# Patient Record
Sex: FEMALE | Employment: FULL TIME | URBAN - METROPOLITAN AREA
[De-identification: names, ages, dates, MRNs, and addresses within clinical notes are randomized per-mention and may not be internally consistent; named-entity substitution may affect disease eponyms.]

---
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
## 2022-09-08 ENCOUNTER — HOSPITAL ENCOUNTER (OUTPATIENT)
Dept: NEUROLOGY | Age: 22
Discharge: HOME | End: 2022-09-08
Payer: COMMERCIAL

## 2022-09-08 PROCEDURE — 95816 EEG AWAKE AND DROWSY: CPT | Performed by: PSYCHIATRY & NEUROLOGY

## 2022-09-08 PROCEDURE — 95816 EEG AWAKE AND DROWSY: CPT

## 2022-09-11 NOTE — PROCEDURES
EEG REPORT       Patient: Ulysses Hose Age: 20 y.o. MRN: [id number]      Referring Provider: Chris Way DO    History: This routine 30 minute scalp EEG was recorded with video- monitoring for a 20 y.o. Kayli Lindquist female  who presented with encephalopathy. This EEG was performed to evaluate for focal and epileptiform abnormalities. Ulysses Hose   Current Outpatient Medications   Medication Sig Dispense Refill    rizatriptan (MAXALT-MLT) 5 MG disintegrating tablet DISSOLVE 1 TABLET IN MOUTH ONCE AS NEEDED FOR MIGRAINE. MAY REPEAT IN 2 HOURS IF NEEDED. 18 tablet 2    Norgestim-Eth Estrad Triphasic 0.18/0.215/0.25 MG-35 MCG TABS Take one pill by mouth once daily. 28 tablet 11    phentermine (ADIPEX-P) 37.5 MG tablet Take 1 tablet by mouth every morning (before breakfast) 30 tablet 0    naproxen (NAPROSYN) 500 MG tablet Take 1 tablet by mouth 2 times daily (with meals) 60 tablet 3     No current facility-administered medications for this encounter. Technical Description: This is a 21 channel digital EEG recording with time-locked video. Electrodes were placed in accordance with the 10-20 International System of Electrode Placement. Single lead EKG monitoring as well as temporal electrodes were included. The patient was not sleep deprived. This recording was obtained during wakefulness. EEG Description: The dominant background activity during maximal recorded wakefulness consisted of bioccipitally dominant 9-10 Hz, 25-35 uV symmetric, regular activity that was reactive to eye opening. During drowsiness, the background rhythm waxed and waned and there were periods of slowing. Deeper sleep was not obtained. Photic stimulation - stepwise photic stimulation at 2-30 Hz was performed and there was a biposterior, symmetric, driving response. Hyperventilation - did not produce any abnormality.      No abnormalities were activated by photic stimulation     The EKG channel demonstrated a normal sinus rhythm. Interpretation  This EEG was normal in wakefulness and sleep. Clinical correlation  This EEG was normal. No focal or epileptiform abnormalities were seen.     Robbin Matute MD  Diplomate, American Board of Psychiatry and Neurology  Diplomate, American Board of Clinical Neurophysiology  Diplomate, American Board of Epilepsy

## 2022-12-05 SDOH — HEALTH STABILITY: PHYSICAL HEALTH: ON AVERAGE, HOW MANY MINUTES DO YOU ENGAGE IN EXERCISE AT THIS LEVEL?: 10 MIN

## 2022-12-05 SDOH — HEALTH STABILITY: PHYSICAL HEALTH: ON AVERAGE, HOW MANY DAYS PER WEEK DO YOU ENGAGE IN MODERATE TO STRENUOUS EXERCISE (LIKE A BRISK WALK)?: 1 DAY

## 2022-12-06 ENCOUNTER — OFFICE VISIT (OUTPATIENT)
Dept: FAMILY MEDICINE CLINIC | Age: 22
End: 2022-12-06
Payer: COMMERCIAL

## 2022-12-06 VITALS
TEMPERATURE: 98.9 F | HEIGHT: 66 IN | DIASTOLIC BLOOD PRESSURE: 70 MMHG | OXYGEN SATURATION: 98 % | BODY MASS INDEX: 46.12 KG/M2 | SYSTOLIC BLOOD PRESSURE: 116 MMHG | HEART RATE: 105 BPM | WEIGHT: 287 LBS

## 2022-12-06 DIAGNOSIS — E66.01 MORBID OBESITY WITH BMI OF 45.0-49.9, ADULT (HCC): ICD-10-CM

## 2022-12-06 DIAGNOSIS — E53.8 B12 DEFICIENCY: ICD-10-CM

## 2022-12-06 DIAGNOSIS — N92.6 IRREGULAR PERIODS: ICD-10-CM

## 2022-12-06 DIAGNOSIS — R42 DIZZINESS: Primary | ICD-10-CM

## 2022-12-06 DIAGNOSIS — E03.8 SUBCLINICAL HYPOTHYROIDISM: ICD-10-CM

## 2022-12-06 DIAGNOSIS — Z12.4 SCREENING FOR CERVICAL CANCER: ICD-10-CM

## 2022-12-06 PROBLEM — F32.A DEPRESSION: Status: ACTIVE | Noted: 2018-11-08

## 2022-12-06 PROCEDURE — 99203 OFFICE O/P NEW LOW 30 MIN: CPT | Performed by: NURSE PRACTITIONER

## 2022-12-06 RX ORDER — MECLIZINE HCL 12.5 MG/1
12.5-25 TABLET ORAL 3 TIMES DAILY PRN
COMMUNITY
Start: 2022-08-12

## 2022-12-06 RX ORDER — LEVOTHYROXINE SODIUM 175 UG/1
175 TABLET ORAL DAILY
Qty: 30 TABLET | Refills: 2 | Status: SHIPPED | OUTPATIENT
Start: 2022-12-06

## 2022-12-06 RX ORDER — FLUTICASONE PROPIONATE 50 MCG
1 SPRAY, SUSPENSION (ML) NASAL DAILY
COMMUNITY
Start: 2022-08-05

## 2022-12-06 RX ORDER — TOPIRAMATE 25 MG/1
TABLET ORAL
COMMUNITY
Start: 2022-10-10

## 2022-12-06 RX ORDER — OMEPRAZOLE 20 MG/1
20 CAPSULE, DELAYED RELEASE ORAL DAILY
COMMUNITY
Start: 2022-06-27

## 2022-12-06 RX ORDER — ONDANSETRON 4 MG/1
4 TABLET, FILM COATED ORAL EVERY 8 HOURS PRN
COMMUNITY

## 2022-12-06 SDOH — ECONOMIC STABILITY: FOOD INSECURITY: WITHIN THE PAST 12 MONTHS, THE FOOD YOU BOUGHT JUST DIDN'T LAST AND YOU DIDN'T HAVE MONEY TO GET MORE.: NEVER TRUE

## 2022-12-06 SDOH — ECONOMIC STABILITY: FOOD INSECURITY: WITHIN THE PAST 12 MONTHS, YOU WORRIED THAT YOUR FOOD WOULD RUN OUT BEFORE YOU GOT MONEY TO BUY MORE.: NEVER TRUE

## 2022-12-06 ASSESSMENT — PATIENT HEALTH QUESTIONNAIRE - PHQ9
SUM OF ALL RESPONSES TO PHQ QUESTIONS 1-9: 1
2. FEELING DOWN, DEPRESSED OR HOPELESS: 1
SUM OF ALL RESPONSES TO PHQ QUESTIONS 1-9: 1
SUM OF ALL RESPONSES TO PHQ9 QUESTIONS 1 & 2: 1
1. LITTLE INTEREST OR PLEASURE IN DOING THINGS: 0
SUM OF ALL RESPONSES TO PHQ QUESTIONS 1-9: 1
SUM OF ALL RESPONSES TO PHQ QUESTIONS 1-9: 1

## 2022-12-06 ASSESSMENT — ENCOUNTER SYMPTOMS
VOMITING: 0
ABDOMINAL PAIN: 0
DIARRHEA: 0
NAUSEA: 0
SHORTNESS OF BREATH: 0
CHEST TIGHTNESS: 0
CONSTIPATION: 0
COUGH: 0

## 2022-12-06 ASSESSMENT — SOCIAL DETERMINANTS OF HEALTH (SDOH): HOW HARD IS IT FOR YOU TO PAY FOR THE VERY BASICS LIKE FOOD, HOUSING, MEDICAL CARE, AND HEATING?: NOT HARD AT ALL

## 2022-12-06 NOTE — PROGRESS NOTES
2022     Vanessa Kong (:  2000) is a 20 y.o. female, here for evaluation of the following medical concerns:    HPI  Pt here today to est. [facility]. Working for [employer]. Is . Has new insurance and needs new neuro referral for ongoing dizziness/migraine issues and also GYN for pap and nexplanon mgmt. States she did have irregular periods before implant and struggles to loose weight. Also has fatigue. Has had several scan of head and labs done within last 6 months for dizziness problem. Denies syncope, chest pain, sob, swelling or tinnutis    Review of Systems   Constitutional:  Positive for fatigue. Negative for appetite change, chills, diaphoresis and fever. Eyes:  Negative for visual disturbance. Respiratory:  Negative for cough, chest tightness and shortness of breath. Cardiovascular:  Negative for chest pain, palpitations and leg swelling. Gastrointestinal:  Negative for abdominal pain, constipation, diarrhea, nausea and vomiting. Endocrine: Negative for cold intolerance, heat intolerance, polydipsia, polyphagia and polyuria. Genitourinary:  Negative for menstrual problem. Skin:  Negative for rash. Allergic/Immunologic: Negative for immunocompromised state. Neurological:  Positive for dizziness. Negative for seizures, syncope, speech difficulty, numbness and headaches. Hematological:  Negative for adenopathy. Psychiatric/Behavioral:  Negative for dysphoric mood and sleep disturbance. The patient is not nervous/anxious. Prior to Visit Medications    Medication Sig Taking?  Authorizing Provider   verapamil (CALAN SR) 120 MG extended release tablet Take 120 mg by mouth nightly Yes Historical Provider, MD   topiramate (TOPAMAX) 25 MG tablet TAKE 1 TABLET BY MOUTH EVERY NIGHT FOR 1 WEEK, THEN 1 TAB IN THE MORNING AND NIGHT FOR 1 WEEK, THEN 1 TAB IN THE MORNING AND 2 TABS AT NIGHT Yes Historical Provider, MD   meclizine (ANTIVERT) 12.5 MG tablet Take 12.5-25 mg by mouth 3 times daily as needed Yes Historical Provider, MD   omeprazole (PRILOSEC) 20 MG delayed release capsule Take 20 mg by mouth daily Yes Historical Provider, MD   fluticasone (FLONASE) 50 MCG/ACT nasal spray 1 spray by Nasal route daily Yes Historical Provider, MD   ondansetron (ZOFRAN) 4 MG tablet Take 4 mg by mouth every 8 hours as needed for Nausea or Vomiting Yes Historical Provider, MD   levothyroxine (SYNTHROID) 175 MCG tablet Take 1 tablet by mouth daily Yes Zak Saucedo APRN - CNP        Social History     Tobacco Use    Smoking status: Never    Smokeless tobacco: Never   Substance Use Topics    Alcohol use: Not Currently        Vitals:    12/06/22 1406   BP: 116/70   Site: Left Lower Arm   Position: Sitting   Cuff Size: Medium Adult   Pulse: (!) 105   Temp: 98.9 °F (37.2 °C)   TempSrc: Tympanic   SpO2: 98%   Weight: 287 lb (130.2 kg)   Height: 5' 5.5\" (1.664 m)     Estimated body mass index is 47.03 kg/m² as calculated from the following:    Height as of this encounter: 5' 5.5\" (1.664 m). Weight as of this encounter: 287 lb (130.2 kg). Physical Exam  Vitals and nursing note reviewed. Constitutional:       General: She is not in acute distress. Appearance: Normal appearance. She is well-developed. She is obese. She is not ill-appearing or diaphoretic. HENT:      Head: Normocephalic and atraumatic. Eyes:      Conjunctiva/sclera: Conjunctivae normal.   Cardiovascular:      Rate and Rhythm: Normal rate and regular rhythm. Pulses: Normal pulses. Heart sounds: Normal heart sounds. No murmur heard. Comments: No LE edema  Pulmonary:      Effort: Pulmonary effort is normal.      Breath sounds: Normal breath sounds. Musculoskeletal:      Cervical back: Neck supple. Skin:     General: Skin is warm and dry. Neurological:      General: No focal deficit present. Mental Status: She is alert and oriented to person, place, and time.  Mental status is at baseline. Psychiatric:         Mood and Affect: Mood normal.         Behavior: Behavior normal.         Thought Content: Thought content normal.         Judgment: Judgment normal.       ASSESSMENT/PLAN:  1. Dizziness  Ongoing issues  New referral given for neuro    - Amb External Referral To Neurology  - PTH, Intact; Future    2. Subclinical hypothyroidism  3 abnormal tsh within last year, she was not notified   Will try daily dose for next 6-8 weeks and recheck labs and see if symptom improvement    - levothyroxine (SYNTHROID) 175 MCG tablet; Take 1 tablet by mouth daily  Dispense: 30 tablet; Refill: 2  - TSH; Future  - T4, Free; Future    3. Screening for cervical cancer  F/u with gyn  - Amb External Referral To Ob-Gyn    4. Morbid obesity with BMI of 45.0-49.9, adult [facility])  Patient advised to follow a low carb, low sugar, healthy fat ( avocados, nuts, olive oil etc.) diet and 150 minutes of cardiovascular exercise per week   Ok for intermittent fasting  Check new labs    - Insulin, total; Future    5. Irregular periods  See above    - Insulin, total; Future    6. B12 deficiency  Not currently taking any supplement    - Vitamin B12; Future    CT brain without contrast    Anatomical Region Laterality Modality   Head -- Computed Tomography   Neuro Covera -- --     Narrative    CT BRAIN WO CONT     HISTORY: 76-year-old female with peripheral vertigo. COMPARISON: CT brain without contrast dated 08/02/2022. TECHNIQUE: CT brain without intravenous contrast.  Automated exposure control was utilized. All CT scans at this facility use dose modulation, iterative reconstruction, and/or weight based dosing when appropriate to reduce radiation dose to as low as reasonably achievable. FINDINGS:   No midline shift, mass effect, acute intracranial hemorrhage, or evidence of acute large vessel ischemia/infarct. There is unchanged punctate hypoattenuation within the left posterior occipital lobe.      The cerebral volume, ventricles, cisterns, and sulci are normal for patient age. Brainstem and cerebellum are unremarkable. Visualized intraorbital contents and the infratemporal soft tissues show no acute abnormality. The visualized paranasal sinuses and mastoid air cells are clear. Osseous structures in skull base and calvarium show no acute abnormality. IMPRESSION:     *  No acute intracranial findings. Approved by Resident Kelly Amezcua DO  on 11/26/2022 4:10 AM     ISalud MD have personally reviewed the image(s) and agree with and/or edited the report     Workstation:[id number]   MRA neck without contrast    Anatomical Region Laterality Modality   Neck -- Magnetic Resonance   Head and Neck -- --   Vascular -- --   Neuro Covera -- --     Narrative    HISTORY: A 14-year-old female with the history of the persistent and recurrent dizziness. Cardiovascular causes suspected. TECHNIQUE:  MR angiogram of the neck is performed without intravenous contrast administration by using 3-D time-of-flight technique. Maximum intensity projection images are reconstructed. COMPARISON:  Comparison is made with MRI examination of brain of 8/2/2022. FINDINGS:  Right innominate, left common carotid and left subclavian arteries are patent without evidence of significant stenosis. Right common carotid and right subclavian arteries are patent. Both vertebral arteries are patent without evidence of significant stenosis or dissection. Both carotid bifurcations are patent without evidence of hemodynamically significant stenosis. Both cervical internal carotid arteries are patent. There is no evidence of dissection. IMPRESSION:   1. Both carotid bifurcations are patent without evidence of significant stenosis or dissection of the internal carotid arteries. 2.  Both vertebral arteries are patent. 3.  Great vessels of the aortic arch are patent.    MRA head without contrast    Anatomical Region Laterality Modality   Head -- Magnetic Resonance   Head and Neck -- --   Angio -- --   Neuro Covera -- --   Neuro Covera -- --     Narrative    HISTORY:  A 23-year-old female with the history of the ataxia and dizziness for one year. No history of trauma. Technique:  MR cerebral angiogram is performed by using 3-D time-of-flight technique. Multiple intensity projection images are reconstructed. COMPARISON:  Comparison is made with MRI examination of brain of 8/2/2022. FINDINGS:  MR angiogram fails to reveal aneurysm or arteriovenous malformation. The cavernous carotid and vertebrobasilar arteries are patent without evidence of significant stenosis. The anterior, middle and posterior cerebral arteries are patent without evidence of significant stenosis. There is no evidence of aneurysm or arteriovenous malformation. IMPRESSION:  Normal MR cerebral angiogram.     Return in about 6 months (around 6/6/2023) for MED CHECK. An electronic signature was used to authenticate this note.     --DEMETRIUS De Guzman - CNP on 12/6/2022 at 4:42 PM

## 2022-12-06 NOTE — PROGRESS NOTES
Visit Information    Have you changed or started any medications since your last visit including any over-the-counter medicines, vitamins, or herbal medicines? no   Have you stopped taking any of your medications? Is so, why? -  no  Are you having any side effects from any of your medications? - no    Have you seen any other physician or provider since your last visit?  no   Have you had any other diagnostic tests since your last visit?  no   Have you been seen in the emergency room and/or had an admission in a hospital since we last saw you?  no   Have you had your routine dental cleaning in the past 6 months?  no     Do you have an active MyChart account? If no, what is the barrier?   Yes    Patient Care Team:  DEMETRIUS Burk - CNP as PCP - General (Nephrology)  [place] MD (Pediatrics)    Medical History Review  Past Medical, Family, and Social History reviewed and  contribute to the patient presenting condition    Health Maintenance   Topic Date Due    Depression Screen  Never done    HIV screen  Never done    [address] screen  Never done    Hepatitis C screen  Never done    Pap smear  Never done    COVID-19 Vaccine (2 - Booster for Diane series) 2021    DTaP/Tdap/Td vaccine (6 - Td or Tdap) 2021    Flu vaccine (1) Never done    Hepatitis A vaccine  Completed    HPV vaccine  Completed    Varicella vaccine  Completed    Hib vaccine  Aged Out    Meningococcal (ACWY) vaccine  Aged Out    Pneumococcal 0-64 years Vaccine  Aged Out

## 2022-12-22 ENCOUNTER — TELEPHONE (OUTPATIENT)
Dept: FAMILY MEDICINE CLINIC | Age: 22
End: 2022-12-22

## 2022-12-22 NOTE — TELEPHONE ENCOUNTER
Patient called stating that she is coming down with cold symptoms and not feeling well and she was looking up how OTC medications would interact with her current medications and it was saying that she needed to be careful with certain cold and flu meds with her current medications. Patient would like suggestions on what would be okay to but OTC to treat her symptoms. Please advise.

## 2022-12-22 NOTE — TELEPHONE ENCOUNTER
She should be ok to take tylenol products, flonase, vit c, push fluids, cough drops, tea with honey, she can ask pharmacist specifically about mucinex, or decongestants

## 2023-01-11 ENCOUNTER — TELEPHONE (OUTPATIENT)
Dept: FAMILY MEDICINE CLINIC | Age: 23
End: 2023-01-11

## 2023-01-11 NOTE — TELEPHONE ENCOUNTER
----- Message from Lidia Arnett sent at 1/10/2023  4:04 PM EST -----  Subject: Referral Request    Reason for referral request? Neurologist Salvador Almanza, DO - [facility]   referral due to new insurance   Provider patient wants to be referred to(if known):     Provider Phone Number(if known): Additional Information for Provider? Patient has an appt on 1/13/2023 &   needs this completed before the appt.  Fax # [phone]  ---------------------------------------------------------------------------  --------------  [id number]    [phone]; OK to leave message on voicemail  ---------------------------------------------------------------------------  --------------